# Patient Record
Sex: FEMALE | Race: WHITE | NOT HISPANIC OR LATINO | URBAN - METROPOLITAN AREA
[De-identification: names, ages, dates, MRNs, and addresses within clinical notes are randomized per-mention and may not be internally consistent; named-entity substitution may affect disease eponyms.]

---

## 2024-10-23 ENCOUNTER — APPOINTMENT (OUTPATIENT)
Age: 34
Setting detail: DERMATOLOGY
End: 2024-10-23

## 2024-10-23 DIAGNOSIS — D18.0 HEMANGIOMA: ICD-10-CM

## 2024-10-23 DIAGNOSIS — L23.7 ALLERGIC CONTACT DERMATITIS DUE TO PLANTS, EXCEPT FOOD: ICD-10-CM

## 2024-10-23 DIAGNOSIS — L81.4 OTHER MELANIN HYPERPIGMENTATION: ICD-10-CM

## 2024-10-23 DIAGNOSIS — D22 MELANOCYTIC NEVI: ICD-10-CM

## 2024-10-23 DIAGNOSIS — L57.8 OTHER SKIN CHANGES DUE TO CHRONIC EXPOSURE TO NONIONIZING RADIATION: ICD-10-CM

## 2024-10-23 PROBLEM — D22.61 MELANOCYTIC NEVI OF RIGHT UPPER LIMB, INCLUDING SHOULDER: Status: ACTIVE | Noted: 2024-10-23

## 2024-10-23 PROBLEM — D18.01 HEMANGIOMA OF SKIN AND SUBCUTANEOUS TISSUE: Status: ACTIVE | Noted: 2024-10-23

## 2024-10-23 PROCEDURE — OTHER COUNSELING ALLERGENS: OTHER

## 2024-10-23 PROCEDURE — OTHER COUNSELING: OTHER

## 2024-10-23 PROCEDURE — OTHER SUNSCREEN RECOMMENDATIONS: OTHER

## 2024-10-23 PROCEDURE — OTHER PRESCRIPTION: OTHER

## 2024-10-23 PROCEDURE — 99213 OFFICE O/P EST LOW 20 MIN: CPT

## 2024-10-23 RX ORDER — CLOBETASOL PROPIONATE 0.5 MG/G
CREAM TOPICAL BID
Qty: 45 | Refills: 3 | Status: ERX | COMMUNITY
Start: 2024-10-23

## 2024-10-23 ASSESSMENT — LOCATION SIMPLE DESCRIPTION DERM
LOCATION SIMPLE: ABDOMEN
LOCATION SIMPLE: LEFT LOWER BACK
LOCATION SIMPLE: RIGHT UPPER BACK
LOCATION SIMPLE: RIGHT ANTERIOR NECK
LOCATION SIMPLE: RIGHT UPPER ARM
LOCATION SIMPLE: LEFT THIGH
LOCATION SIMPLE: LEFT PRETIBIAL REGION
LOCATION SIMPLE: RIGHT THIGH

## 2024-10-23 ASSESSMENT — LOCATION DETAILED DESCRIPTION DERM
LOCATION DETAILED: LEFT INFERIOR MEDIAL MIDBACK
LOCATION DETAILED: RIGHT INFERIOR ANTERIOR NECK
LOCATION DETAILED: RIGHT ANTERIOR DISTAL THIGH
LOCATION DETAILED: EPIGASTRIC SKIN
LOCATION DETAILED: LEFT ANTERIOR PROXIMAL THIGH
LOCATION DETAILED: LEFT PROXIMAL PRETIBIAL REGION
LOCATION DETAILED: RIGHT SUPERIOR UPPER BACK
LOCATION DETAILED: RIGHT PROXIMAL POSTERIOR UPPER ARM

## 2024-10-23 ASSESSMENT — BSA RASH: BSA RASH: 3

## 2024-10-23 ASSESSMENT — ITCH NUMERIC RATING SCALE: HOW SEVERE IS YOUR ITCHING?: 7

## 2024-10-23 ASSESSMENT — LOCATION ZONE DERM
LOCATION ZONE: ARM
LOCATION ZONE: NECK
LOCATION ZONE: LEG
LOCATION ZONE: TRUNK

## 2025-05-13 ENCOUNTER — HOSPITAL ENCOUNTER (EMERGENCY)
Facility: HOSPITAL | Age: 35
Discharge: HOME | End: 2025-05-13
Attending: EMERGENCY MEDICINE | Admitting: STUDENT IN AN ORGANIZED HEALTH CARE EDUCATION/TRAINING PROGRAM
Payer: COMMERCIAL

## 2025-05-13 VITALS
TEMPERATURE: 97.4 F | HEART RATE: 84 BPM | SYSTOLIC BLOOD PRESSURE: 115 MMHG | RESPIRATION RATE: 15 BRPM | DIASTOLIC BLOOD PRESSURE: 63 MMHG | OXYGEN SATURATION: 96 %

## 2025-05-13 PROBLEM — Q76.0 SPINA BIFIDA OCCULTA: Status: ACTIVE | Noted: 2022-01-07

## 2025-05-13 PROBLEM — O34.80 POLYCYSTIC OVARY AFFECTING PREGNANCY, ANTEPARTUM: Status: ACTIVE | Noted: 2022-01-07

## 2025-05-13 PROBLEM — E28.2 POLYCYSTIC OVARY AFFECTING PREGNANCY, ANTEPARTUM: Status: ACTIVE | Noted: 2022-01-07

## 2025-05-13 PROBLEM — Z3A.25 25 WEEKS GESTATION OF PREGNANCY: Status: ACTIVE | Noted: 2025-05-13

## 2025-05-13 PROBLEM — O09.299 HISTORY OF PRE-ECLAMPSIA IN PRIOR PREGNANCY, CURRENTLY PREGNANT: Status: ACTIVE | Noted: 2025-05-13

## 2025-05-13 PROBLEM — K21.9 GERD (GASTROESOPHAGEAL REFLUX DISEASE): Status: ACTIVE | Noted: 2025-05-13

## 2025-05-13 PROBLEM — O36.8120 DECREASED FETAL MOVEMENT AFFECTING MANAGEMENT OF PREGNANCY IN SECOND TRIMESTER: Status: ACTIVE | Noted: 2025-05-13

## 2025-05-13 PROCEDURE — 99283 EMERGENCY DEPT VISIT LOW MDM: CPT

## 2025-05-13 PROCEDURE — 76815 OB US LIMITED FETUS(S): CPT | Performed by: OBSTETRICS & GYNECOLOGY

## 2025-05-13 PROCEDURE — NC001 PR NO CHARGE: Performed by: OBSTETRICS & GYNECOLOGY

## 2025-05-13 PROCEDURE — 59025 FETAL NON-STRESS TEST: CPT | Performed by: OBSTETRICS & GYNECOLOGY

## 2025-05-13 PROCEDURE — 99203 OFFICE O/P NEW LOW 30 MIN: CPT

## 2025-05-13 NOTE — H&P
Triage Note - OB  Cele Correa 34 y.o. female MRN: [id number]  Unit/Bed#: LD TRIAGE 1- Encounter: [id number]        ASSESSMENT/PLAN  Cele Correa is a 34 y.o.  at 25w5d  with decreased fetal movement.    Assessment & Plan  Decreased fetal movement affecting management of pregnancy in second trimester  - NST reactive and reassuring for 25 week gestation; 125 bpm + moderate variability + accels no decels; no contractions  - Bedside US shows active fetus in breech position, anterior placenta and MARIA E 21 cm. + fetal movement noted and also corroborated by patient.  Ok for discharge now, will give patient phone number to call our office to transfer as she lives close to here  History of pre-eclampsia in prior pregnancy, currently pregnant  - will need to monitor for recurrence  - takes daily ASA 81 mg  25 weeks gestation of pregnancy           Discharge instructions  - Patient instructed to call if experiencing worsening contractions, vaginal bleeding, loss of fluid or decreased fetal movement.  - Will follow up with OBGYN in office      She is a patient of [facility], looking to transfer here  D/w Dr. Gallegos, on call OBGYN Attending Physician  ______________    SUBJECTIVE    FANNIE: Estimated Date of Delivery: 25    HPI:  34 y.o.  25w5d presents with decreased fetal movement most of the day. Denies contractions, leaking of fluid or vaginal bleeding.     Current patient of [facility].    2 prior vaginal deliveries at 35 and 36 weeks.   History of preeclampsia, severe in last pregnancy. Is taking low dose aspirin 81 mg daily.    Denies history of asthma, DM or other hypertension.  Denies any abdominal surgery but has had wisdom teeth removed.    Denies history of blood transfusion.  Prenatal labs:  NA    Her obstetrical history is significant for      No past medical history on file.    Past Surgical History:   Procedure Laterality Date    WISDOM TOOTH EXTRACTION Bilateral            ROS:  Constitutional:  "Negative  Respiratory: Negative  Cardiovascular: Negative    Gastrointestinal: Negative     Physical Exam  General: Well appearing, no distress  Respiratory: CTAB   Cardiovascular: Regular rate  Abdomen: Soft, gravid, nontender    Extremities: Warm and well perfused.  Non tender.      OBJECTIVE:  /63   Pulse 84   Temp (!) 97.4 °F (36.3 °C) (Temporal)   Resp 15   SpO2 96%   There is no height or weight on file to calculate BMI.  Labs: No results found for this or any previous visit (from the past 24 hours).      SVE:    deferred      FHT: 125 + moderate variability + accels no decels    TOCO: none        IMAGING:   active fetus in breech position, anterior placenta and MARIA E 21 cm. + fetal movement noted and also corroborated by patient.      Farrha Romero MD  OB/GYN   5/13/2025  5:37 PM      Portions of the record may have been created with voice recognition software.  Occasional wrong word or \"sound a like\" substitutions may have occurred due to the inherent limitations of voice recognition software.  Read the chart carefully and recognize, using context, where substitutions have occurred  "

## 2025-05-13 NOTE — ASSESSMENT & PLAN NOTE
- NST reactive and reassuring for 25 week gestation; 125 bpm + moderate variability + accels no decels; no contractions  - Bedside US shows active fetus in breech position, anterior placenta and MARIA E 21 cm. + fetal movement noted and also corroborated by patient.  Ok for discharge now, will give patient phone number to call our office to transfer as she lives close to here

## 2025-05-13 NOTE — PROCEDURES
Cele Correa, a  at 25w5d with an FANNIE of 2025, Date entered prior to episode creation, was seen at [facility] for the following procedure(s): $Procedure Type: NST, MARIA E]    Nonstress Test  Reason for NST: Decreased Fetal Movement  Variability: Moderate  Decelerations: None  Accelerations: Yes  Acoustic Stimulator: No  Baseline: 125 BPM  Uterine Irritability: No  Contractions: Not present    4 Quadrant MARIA E  MARIA E Q1 (cm): 3.5 cm  MARIA E Q2 (cm): 3.8 cm  AMRIA E Q3 (cm): 6.2 cm  MARIA E Q4 (cm): 8.1 cm  MARIA E TOTAL (cm): 21.6 cm              Interpretation  Nonstress Test Interpretation: Reactive  Overall Impression: Reassuring  Comments: active fetus in breech presentation; movement seen and corroborated by patient; anterior placenta